# Patient Record
Sex: MALE | Race: BLACK OR AFRICAN AMERICAN | NOT HISPANIC OR LATINO
[De-identification: names, ages, dates, MRNs, and addresses within clinical notes are randomized per-mention and may not be internally consistent; named-entity substitution may affect disease eponyms.]

---

## 2018-07-17 PROBLEM — Z00.00 ENCOUNTER FOR PREVENTIVE HEALTH EXAMINATION: Status: ACTIVE | Noted: 2018-07-17

## 2018-07-31 ENCOUNTER — APPOINTMENT (OUTPATIENT)
Dept: PULMONOLOGY | Facility: CLINIC | Age: 43
End: 2018-07-31
Payer: COMMERCIAL

## 2018-07-31 VITALS
TEMPERATURE: 98.7 F | SYSTOLIC BLOOD PRESSURE: 130 MMHG | WEIGHT: 270 LBS | BODY MASS INDEX: 50.32 KG/M2 | DIASTOLIC BLOOD PRESSURE: 100 MMHG | HEART RATE: 89 BPM | OXYGEN SATURATION: 97 % | HEIGHT: 61.32 IN

## 2018-07-31 DIAGNOSIS — Z86.79 PERSONAL HISTORY OF OTHER DISEASES OF THE CIRCULATORY SYSTEM: ICD-10-CM

## 2018-07-31 DIAGNOSIS — Z78.9 OTHER SPECIFIED HEALTH STATUS: ICD-10-CM

## 2018-07-31 DIAGNOSIS — G47.00 INSOMNIA, UNSPECIFIED: ICD-10-CM

## 2018-07-31 DIAGNOSIS — Z72.0 TOBACCO USE: ICD-10-CM

## 2018-07-31 DIAGNOSIS — G47.19 OTHER HYPERSOMNIA: ICD-10-CM

## 2018-07-31 DIAGNOSIS — R06.83 SNORING: ICD-10-CM

## 2018-07-31 PROCEDURE — 99204 OFFICE O/P NEW MOD 45 MIN: CPT

## 2018-08-14 ENCOUNTER — APPOINTMENT (OUTPATIENT)
Dept: SLEEP CENTER | Facility: HOSPITAL | Age: 43
End: 2018-08-14

## 2018-09-10 ENCOUNTER — APPOINTMENT (OUTPATIENT)
Dept: SLEEP CENTER | Facility: HOME HEALTH | Age: 43
End: 2018-09-10

## 2018-09-28 ENCOUNTER — OTHER (OUTPATIENT)
Age: 43
End: 2018-09-28

## 2019-05-01 ENCOUNTER — APPOINTMENT (OUTPATIENT)
Dept: PULMONOLOGY | Facility: CLINIC | Age: 44
End: 2019-05-01
Payer: COMMERCIAL

## 2019-05-01 VITALS
HEIGHT: 71 IN | SYSTOLIC BLOOD PRESSURE: 160 MMHG | DIASTOLIC BLOOD PRESSURE: 120 MMHG | BODY MASS INDEX: 39.2 KG/M2 | TEMPERATURE: 98.5 F | HEART RATE: 83 BPM | WEIGHT: 280 LBS | OXYGEN SATURATION: 97 %

## 2019-05-01 DIAGNOSIS — Z99.89 OBSTRUCTIVE SLEEP APNEA (ADULT) (PEDIATRIC): ICD-10-CM

## 2019-05-01 DIAGNOSIS — E66.9 OBESITY, UNSPECIFIED: ICD-10-CM

## 2019-05-01 DIAGNOSIS — G47.33 OBSTRUCTIVE SLEEP APNEA (ADULT) (PEDIATRIC): ICD-10-CM

## 2019-05-01 DIAGNOSIS — E66.01 MORBID (SEVERE) OBESITY DUE TO EXCESS CALORIES: ICD-10-CM

## 2019-05-01 PROCEDURE — 99204 OFFICE O/P NEW MOD 45 MIN: CPT

## 2019-05-01 NOTE — REVIEW OF SYSTEMS
[EDS: ESS=____] : daytime somnolence: ESS=[unfilled] [Fatigue] : fatigue [A.M. Dry Mouth] : a.m. dry mouth [Snoring] : snoring [Witnessed Apneas] : witnessed apnea [Obesity] : obesity [A.M. Headache] : headache present upon awakening [Negative] : Hematologic

## 2019-05-17 ENCOUNTER — OTHER (OUTPATIENT)
Age: 44
End: 2019-05-17

## 2019-06-05 ENCOUNTER — APPOINTMENT (OUTPATIENT)
Dept: PULMONOLOGY | Facility: CLINIC | Age: 44
End: 2019-06-05

## 2019-06-10 ENCOUNTER — APPOINTMENT (OUTPATIENT)
Dept: SLEEP CENTER | Facility: HOSPITAL | Age: 44
End: 2019-06-10

## 2019-06-10 ENCOUNTER — OUTPATIENT (OUTPATIENT)
Dept: OUTPATIENT SERVICES | Facility: HOSPITAL | Age: 44
LOS: 1 days | End: 2019-06-10
Payer: COMMERCIAL

## 2019-06-10 DIAGNOSIS — G47.33 OBSTRUCTIVE SLEEP APNEA (ADULT) (PEDIATRIC): ICD-10-CM

## 2019-06-10 PROCEDURE — 95810 POLYSOM 6/> YRS 4/> PARAM: CPT

## 2019-06-10 PROCEDURE — 95810 POLYSOM 6/> YRS 4/> PARAM: CPT | Mod: 26

## 2019-06-27 ENCOUNTER — APPOINTMENT (OUTPATIENT)
Dept: PULMONOLOGY | Facility: CLINIC | Age: 44
End: 2019-06-27
Payer: COMMERCIAL

## 2019-06-27 VITALS
DIASTOLIC BLOOD PRESSURE: 101 MMHG | TEMPERATURE: 98.3 F | BODY MASS INDEX: 39.2 KG/M2 | SYSTOLIC BLOOD PRESSURE: 148 MMHG | HEART RATE: 104 BPM | HEIGHT: 71 IN | WEIGHT: 280 LBS | OXYGEN SATURATION: 99 %

## 2019-06-27 PROCEDURE — 99213 OFFICE O/P EST LOW 20 MIN: CPT

## 2019-06-27 NOTE — HISTORY OF PRESENT ILLNESS
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 Rooney for initial evaluation. \par He was Dx'd with RACQUEL over 7 years ago and had a CPAP machine but only used it for 2 months because he was not comfortable with it. Although when he used it he felt refreshed in AM. He does not recall the AHI but he was told he had moderate-severe apnea. \par He drinks and smokes occasionally mostly on weekends and he notices worsening snoring when he drinks.\par \par Sleep Routine:\par \par He goes to bed at 12 AM, sleep latency is about 30 min, he wakes up once, WASO is about 10 min and then he is up between 6-7 AM. He does not nap. His ESS is 10/24\par \par He reports morning HA,  RLS, 10lb weight gain in the past 6 month but denies cataplexy, RLS, parasomnia, or any other sleep behavioral issues.\par

## 2019-06-27 NOTE — REASON FOR VISIT
[Initial Evaluation] : an initial evaluation [Sleep Apnea] : sleep apnea [FreeTextEntry2] : known RACQUEL

## 2019-06-27 NOTE — PHYSICAL EXAM
[General Appearance - Well Developed] : well developed [Normal Appearance] : normal appearance [Well Groomed] : well groomed [General Appearance - Well Nourished] : well nourished [No Deformities] : no deformities [General Appearance - In No Acute Distress] : no acute distress [Normal Conjunctiva] : the conjunctiva exhibited no abnormalities [IV] : IV [Eyelids - No Xanthelasma] : the eyelids demonstrated no xanthelasmas [Neck Appearance] : the appearance of the neck was normal [Neck Cervical Mass (___cm)] : no neck mass was observed [Jugular Venous Distention Increased] : there was no jugular-venous distention [Thyroid Diffuse Enlargement] : the thyroid was not enlarged [Thyroid Nodule] : there were no palpable thyroid nodules [Heart Rate And Rhythm] : heart rate was normal and rhythm regular [Murmurs] : no murmurs [Heart Sounds] : normal S1 and S2 [Heart Sounds Gallop] : no gallops [Heart Sounds Pericardial Friction Rub] : no pericardial rub [Auscultation Breath Sounds / Voice Sounds] : lungs were clear to auscultation bilaterally [Bowel Sounds] : normal bowel sounds [Abdomen Soft] : soft [Abdomen Tenderness] : non-tender [Abdomen Mass (___ Cm)] : no abdominal mass palpated [Abnormal Walk] : normal gait [Musculoskeletal - Swelling] : no joint swelling seen [Motor Tone] : muscle strength and tone were normal [Cyanosis, Localized] : no localized cyanosis [Petechial Hemorrhages (___cm)] : no petechial hemorrhages [Nail Clubbing] : no clubbing of the fingernails [Skin Color & Pigmentation] : normal skin color and pigmentation [Skin Turgor] : normal skin turgor [] : no rash [Sensation] : the sensory exam was normal to light touch and pinprick [No Focal Deficits] : no focal deficits [Deep Tendon Reflexes (DTR)] : deep tendon reflexes were 2+ and symmetric

## 2019-06-27 NOTE — PHYSICAL EXAM
[Normal Conjunctiva] : the conjunctiva exhibited no abnormalities [Eyelids - No Xanthelasma] : the eyelids demonstrated no xanthelasmas [IV] : IV [Heart Rate And Rhythm] : heart rate was normal and rhythm regular [Heart Sounds] : normal S1 and S2 [Heart Sounds Gallop] : no gallops [Murmurs] : no murmurs [Heart Sounds Pericardial Friction Rub] : no pericardial rub [Auscultation Breath Sounds / Voice Sounds] : lungs were clear to auscultation bilaterally [Abnormal Walk] : normal gait [Musculoskeletal - Swelling] : no joint swelling seen [Nail Clubbing] : no clubbing of the fingernails [Motor Tone] : muscle strength and tone were normal [Cyanosis, Localized] : no localized cyanosis [Petechial Hemorrhages (___cm)] : no petechial hemorrhages [Skin Color & Pigmentation] : normal skin color and pigmentation [Skin Turgor] : normal skin turgor [] : no rash [Sensation] : the sensory exam was normal to light touch and pinprick [Deep Tendon Reflexes (DTR)] : deep tendon reflexes were 2+ and symmetric [No Focal Deficits] : no focal deficits [Oriented To Time, Place, And Person] : oriented to person, place, and time [Affect] : the affect was normal [Impaired Insight] : insight and judgment were intact [FreeTextEntry1] : obese

## 2019-06-27 NOTE — END OF VISIT
[FreeTextEntry3] : Case reviewed and patient examined with PA, plans as noted. Only mild obstructive sleep apnea, consider oral appliance (mandibular advancer)

## 2019-06-27 NOTE — HISTORY OF PRESENT ILLNESS
[FreeTextEntry1] : 42 y/o M who underwent PSH on 6/10/2019 and the test showed AHI using the AASM criteria at 10.9. The AHI using CMS criteria was 7.6 with zero minute in REM stage and minimum oxygenation saturation of 81%.\par \par He denies any new complain or new diagnosis since the last Visit. \par